# Patient Record
(demographics unavailable — no encounter records)

---

## 2024-12-17 NOTE — PLAN
[FreeTextEntry1] : 33 y/o F presenting for 2 weeks post operative check -Patient recovering well -Advised to use scar away silicone strip and vtiamin E for incision -Anemia- Anemia panel done today -F/U in 4 weeks for 6 post op

## 2024-12-17 NOTE — HISTORY OF PRESENT ILLNESS
[Pain is well-controlled] : pain is well-controlled [Clean/Dry/Intact] : clean, dry and intact [Healed] : healed [None] : no vaginal bleeding [Normal] : normal [Pathology reviewed] : pathology reviewed [Fever] : no fever [Chills] : no chills [Nausea] : no nausea [Vomiting] : no vomiting [Erythema] : not erythematous [de-identified] : 15 [de-identified] : Patient is a 35 y/o F s/p 2 weeks EUA, abdominal myomectomy on 12/02/2024 for fibroids. Patient is feeling well today. Pain well controlled, ambulating, voiding, tolerating PO. Denies subjective fevers and chills.   Pathology Reviewed:  benign  [de-identified] : Steri strips removed

## 2025-01-09 NOTE — HISTORY OF PRESENT ILLNESS
[Pain is well-controlled] : pain is well-controlled [Clean/Dry/Intact] : clean, dry and intact [None] : no vaginal bleeding [Normal] : normal [Pathology reviewed] : pathology reviewed [Fever] : no fever [Chills] : no chills [Nausea] : no nausea [Vomiting] : no vomiting [Vaginal Discharge] : no vaginal discharge [Erythema] : not erythematous [de-identified] : Patient is a 36 y/o F s/p Abdominal Myomectomy on 12/2/2024 for Fibroids. Patient is feeling well today. Pain well controlled, ambulating, voiding, tolerating PO. Denies subjective fevers and chills. Had one period Post op week 2-3 which was associated with some painful cramping but patient did not need TXA.

## 2025-01-09 NOTE — HISTORY OF PRESENT ILLNESS
[Pain is well-controlled] : pain is well-controlled [Clean/Dry/Intact] : clean, dry and intact [None] : no vaginal bleeding [Normal] : normal [Pathology reviewed] : pathology reviewed [Fever] : no fever [Chills] : no chills [Nausea] : no nausea [Vomiting] : no vomiting [Vaginal Discharge] : no vaginal discharge [Erythema] : not erythematous [de-identified] : Patient is a 34 y/o F s/p Abdominal Myomectomy on 12/2/2024 for Fibroids. Patient is feeling well today. Pain well controlled, ambulating, voiding, tolerating PO. Denies subjective fevers and chills. Had one period Post op week 2-3 which was associated with some painful cramping but patient did not need TXA.

## 2025-01-09 NOTE — PLAN
[FreeTextEntry1] : 36 y/o F presenting for post operative check -Patient recovering well -Recommend silicone strips in days and Vitamin E oil massage at night -Naproxen sent for cramping with menses but anticipate that menses will continue to improve over the next few cycles -She is cleared to resume normal activity including sexual intercourse and exercise -f/u 6 months for annual

## 2025-01-09 NOTE — PLAN
[FreeTextEntry1] : 34 y/o F presenting for post operative check -Patient recovering well -Recommend silicone strips in days and Vitamin E oil massage at night -Naproxen sent for cramping with menses but anticipate that menses will continue to improve over the next few cycles -She is cleared to resume normal activity including sexual intercourse and exercise -f/u 6 months for annual